# Patient Record
Sex: MALE | Race: WHITE | URBAN - METROPOLITAN AREA
[De-identification: names, ages, dates, MRNs, and addresses within clinical notes are randomized per-mention and may not be internally consistent; named-entity substitution may affect disease eponyms.]

---

## 2017-02-23 ENCOUNTER — TELEPHONE (OUTPATIENT)
Dept: INTERNAL MEDICINE | Age: 32
End: 2017-02-23

## 2017-02-23 ENCOUNTER — HOSPITAL ENCOUNTER (OUTPATIENT)
Dept: OTHER | Age: 32
Discharge: HOME | End: 2017-02-23
Attending: INTERNAL MEDICINE | Admitting: INTERNAL MEDICINE

## 2017-02-23 ENCOUNTER — OFFICE VISIT (OUTPATIENT)
Dept: INTERNAL MEDICINE | Age: 32
End: 2017-02-23

## 2017-02-23 VITALS
SYSTOLIC BLOOD PRESSURE: 122 MMHG | HEART RATE: 63 BPM | OXYGEN SATURATION: 98 % | BODY MASS INDEX: 27.25 KG/M2 | DIASTOLIC BLOOD PRESSURE: 72 MMHG | WEIGHT: 174 LBS

## 2017-02-23 DIAGNOSIS — R20.2 PARESTHESIA OF LEFT ARM AND LEG: ICD-10-CM

## 2017-02-23 DIAGNOSIS — R20.2 PARESTHESIA OF LEFT ARM AND LEG: Primary | ICD-10-CM

## 2017-02-23 PROCEDURE — 99213 OFFICE O/P EST LOW 20 MIN: CPT | Performed by: INTERNAL MEDICINE

## 2017-02-23 RX ORDER — PREDNISONE 10 MG/1
TABLET ORAL
Qty: 30 TABLET | Refills: 0 | Status: SHIPPED | OUTPATIENT
Start: 2017-02-23 | End: 2017-03-10

## 2017-02-23 ASSESSMENT — ENCOUNTER SYMPTOMS
SHORTNESS OF BREATH: 0
WHEEZING: 0
CHEST TIGHTNESS: 0
RESPIRATORY NEGATIVE: 1
GASTROINTESTINAL NEGATIVE: 1

## 2017-02-28 ENCOUNTER — HOSPITAL ENCOUNTER (OUTPATIENT)
Dept: NEUROLOGY | Age: 32
Discharge: HOME | End: 2017-02-28
Attending: INTERNAL MEDICINE | Admitting: INTERNAL MEDICINE

## 2017-02-28 DIAGNOSIS — R20.2 PARESTHESIA OF SKIN: ICD-10-CM

## 2017-03-06 RX ORDER — CYCLOBENZAPRINE HCL 10 MG
10 TABLET ORAL 3 TIMES DAILY PRN
Qty: 20 TABLET | Refills: 0 | Status: SHIPPED | OUTPATIENT
Start: 2017-03-06 | End: 2017-03-16

## 2017-03-06 RX ORDER — TRAMADOL HYDROCHLORIDE 50 MG/1
50 TABLET ORAL 3 TIMES DAILY PRN
Qty: 30 TABLET | Refills: 0 | Status: SHIPPED | OUTPATIENT
Start: 2017-03-06 | End: 2018-10-30

## 2017-03-09 ENCOUNTER — PATIENT MESSAGE (OUTPATIENT)
Dept: INTERNAL MEDICINE | Age: 32
End: 2017-03-09

## 2017-03-10 ENCOUNTER — OFFICE VISIT (OUTPATIENT)
Dept: INTERNAL MEDICINE | Age: 32
End: 2017-03-10

## 2017-03-10 VITALS
HEIGHT: 70 IN | DIASTOLIC BLOOD PRESSURE: 62 MMHG | OXYGEN SATURATION: 100 % | SYSTOLIC BLOOD PRESSURE: 102 MMHG | BODY MASS INDEX: 24.34 KG/M2 | WEIGHT: 170 LBS | HEART RATE: 70 BPM

## 2017-03-10 DIAGNOSIS — S46.812A STRAIN OF LEFT TRAPEZIUS MUSCLE, INITIAL ENCOUNTER: Primary | ICD-10-CM

## 2017-03-10 PROCEDURE — 99213 OFFICE O/P EST LOW 20 MIN: CPT | Performed by: INTERNAL MEDICINE

## 2017-03-10 RX ORDER — NABUMETONE 750 MG/1
750 TABLET, FILM COATED ORAL 2 TIMES DAILY WITH MEALS
Qty: 60 TABLET | Refills: 0 | Status: SHIPPED | OUTPATIENT
Start: 2017-03-10 | End: 2018-10-30

## 2017-03-13 ENCOUNTER — TELEPHONE (OUTPATIENT)
Dept: INTERNAL MEDICINE | Age: 32
End: 2017-03-13

## 2017-03-14 ENCOUNTER — PATIENT MESSAGE (OUTPATIENT)
Dept: INTERNAL MEDICINE | Age: 32
End: 2017-03-14

## 2018-02-01 ENCOUNTER — TELEPHONE (OUTPATIENT)
Dept: INTERNAL MEDICINE | Age: 33
End: 2018-02-01

## 2018-02-02 ENCOUNTER — OFFICE VISIT (OUTPATIENT)
Dept: INTERNAL MEDICINE | Age: 33
End: 2018-02-02

## 2018-02-02 VITALS
BODY MASS INDEX: 24.82 KG/M2 | HEART RATE: 68 BPM | WEIGHT: 173 LBS | DIASTOLIC BLOOD PRESSURE: 76 MMHG | SYSTOLIC BLOOD PRESSURE: 124 MMHG | OXYGEN SATURATION: 97 %

## 2018-02-02 DIAGNOSIS — M70.52 PES ANSERINUS BURSITIS OF LEFT KNEE: ICD-10-CM

## 2018-02-02 PROCEDURE — 99213 OFFICE O/P EST LOW 20 MIN: CPT | Performed by: INTERNAL MEDICINE

## 2018-02-02 RX ORDER — IBUPROFEN 800 MG/1
800 TABLET ORAL
Qty: 90 TABLET | Refills: 0 | Status: SHIPPED | OUTPATIENT
Start: 2018-02-02 | End: 2019-06-25

## 2018-02-02 ASSESSMENT — PATIENT HEALTH QUESTIONNAIRE - PHQ9
SUM OF ALL RESPONSES TO PHQ QUESTIONS 1-9: 0
1. LITTLE INTEREST OR PLEASURE IN DOING THINGS: 0
2. FEELING DOWN, DEPRESSED OR HOPELESS: 0
SUM OF ALL RESPONSES TO PHQ9 QUESTIONS 1 & 2: 0

## 2018-02-02 NOTE — ASSESSMENT & PLAN NOTE
X ray  ACE  Heating pad 10 minutes tid  Motrin 800 mg tid with food  Exercises given  Call if no better

## 2018-03-08 RX ORDER — ALBUTEROL SULFATE 2.5 MG/3ML
2.5 SOLUTION RESPIRATORY (INHALATION) EVERY 6 HOURS PRN
Qty: 120 EACH | Refills: 3 | Status: SHIPPED | OUTPATIENT
Start: 2018-03-08 | End: 2018-11-19

## 2018-10-30 ENCOUNTER — OFFICE VISIT (OUTPATIENT)
Dept: INTERNAL MEDICINE CLINIC | Age: 33
End: 2018-10-30
Payer: COMMERCIAL

## 2018-10-30 ENCOUNTER — TELEPHONE (OUTPATIENT)
Dept: INTERNAL MEDICINE CLINIC | Age: 33
End: 2018-10-30

## 2018-10-30 VITALS
HEART RATE: 72 BPM | BODY MASS INDEX: 23.85 KG/M2 | SYSTOLIC BLOOD PRESSURE: 120 MMHG | HEIGHT: 70 IN | DIASTOLIC BLOOD PRESSURE: 60 MMHG | OXYGEN SATURATION: 97 % | WEIGHT: 166.6 LBS | TEMPERATURE: 97.6 F

## 2018-10-30 DIAGNOSIS — J45.20 INTERMITTENT ASTHMA WITHOUT COMPLICATION, UNSPECIFIED ASTHMA SEVERITY: ICD-10-CM

## 2018-10-30 PROCEDURE — 99213 OFFICE O/P EST LOW 20 MIN: CPT | Performed by: INTERNAL MEDICINE

## 2018-10-30 RX ORDER — METHYLPREDNISOLONE 4 MG/1
TABLET ORAL
Qty: 1 KIT | Refills: 0 | Status: SHIPPED | OUTPATIENT
Start: 2018-10-30 | End: 2018-11-05

## 2018-10-30 ASSESSMENT — ENCOUNTER SYMPTOMS
SHORTNESS OF BREATH: 1
CHEST TIGHTNESS: 0
WHEEZING: 1
GASTROINTESTINAL NEGATIVE: 1
COUGH: 1

## 2018-10-30 ASSESSMENT — PATIENT HEALTH QUESTIONNAIRE - PHQ9
2. FEELING DOWN, DEPRESSED OR HOPELESS: 0
SUM OF ALL RESPONSES TO PHQ9 QUESTIONS 1 & 2: 0
1. LITTLE INTEREST OR PLEASURE IN DOING THINGS: 0
SUM OF ALL RESPONSES TO PHQ QUESTIONS 1-9: 0
SUM OF ALL RESPONSES TO PHQ QUESTIONS 1-9: 0

## 2019-06-25 ENCOUNTER — HOSPITAL ENCOUNTER (EMERGENCY)
Age: 34
Discharge: HOME | End: 2019-06-25
Payer: COMMERCIAL

## 2019-06-25 VITALS
OXYGEN SATURATION: 94 % | SYSTOLIC BLOOD PRESSURE: 150 MMHG | WEIGHT: 170 LBS | TEMPERATURE: 97.9 F | DIASTOLIC BLOOD PRESSURE: 82 MMHG | RESPIRATION RATE: 16 BRPM | BODY MASS INDEX: 24.34 KG/M2 | HEIGHT: 70 IN | HEART RATE: 87 BPM

## 2019-06-25 DIAGNOSIS — K08.539 FRACTURE OF DENTURE: Primary | ICD-10-CM

## 2019-06-25 PROCEDURE — 6370000000 HC RX 637 (ALT 250 FOR IP): Performed by: PHYSICIAN ASSISTANT

## 2019-06-25 PROCEDURE — 99282 EMERGENCY DEPT VISIT SF MDM: CPT

## 2019-06-25 RX ORDER — CHLORHEXIDINE GLUCONATE 0.12 MG/ML
15 RINSE ORAL 2 TIMES DAILY
Qty: 420 ML | Refills: 0 | Status: SHIPPED | OUTPATIENT
Start: 2019-06-25 | End: 2019-07-09

## 2019-06-25 RX ORDER — IBUPROFEN 800 MG/1
800 TABLET ORAL ONCE
Status: COMPLETED | OUTPATIENT
Start: 2019-06-25 | End: 2019-06-25

## 2019-06-25 RX ORDER — HYDROCODONE BITARTRATE AND ACETAMINOPHEN 5; 325 MG/1; MG/1
1 TABLET ORAL ONCE
Status: COMPLETED | OUTPATIENT
Start: 2019-06-25 | End: 2019-06-25

## 2019-06-25 RX ORDER — HYDROCODONE BITARTRATE AND ACETAMINOPHEN 5; 325 MG/1; MG/1
1 TABLET ORAL EVERY 6 HOURS PRN
Qty: 10 TABLET | Refills: 0 | Status: SHIPPED | OUTPATIENT
Start: 2019-06-25 | End: 2019-06-28

## 2019-06-25 RX ORDER — IBUPROFEN 800 MG/1
800 TABLET ORAL EVERY 8 HOURS PRN
Qty: 20 TABLET | Refills: 0 | Status: SHIPPED | OUTPATIENT
Start: 2019-06-25

## 2019-06-25 RX ADMIN — IBUPROFEN 800 MG: 800 TABLET, FILM COATED ORAL at 22:23

## 2019-06-25 RX ADMIN — HYDROCODONE BITARTRATE AND ACETAMINOPHEN 1 TABLET: 5; 325 TABLET ORAL at 22:23

## 2019-06-25 ASSESSMENT — ENCOUNTER SYMPTOMS
VOMITING: 0
ABDOMINAL PAIN: 0
WHEEZING: 0
NAUSEA: 0
COUGH: 0
SHORTNESS OF BREATH: 0
RHINORRHEA: 0
DIARRHEA: 0

## 2019-06-25 ASSESSMENT — PAIN SCALES - GENERAL
PAINLEVEL_OUTOF10: 6
PAINLEVEL_OUTOF10: 6

## 2019-06-26 ENCOUNTER — TELEPHONE (OUTPATIENT)
Dept: INTERNAL MEDICINE CLINIC | Age: 34
End: 2019-06-26

## 2019-06-26 NOTE — ED PROVIDER NOTES
[facility]        Pt Name: Champ Farr  MRN: [id number]  [place] 1985  Date of evaluation: 6/25/2019  Provider: Basil Odom PA-C  PCP: Darin Chen MD    This patient was not seen and evaluated by the attending physician No att. providers found. CHIEF COMPLAINT       Chief Complaint   Patient presents with   [address] Injury     playing hockey, struck in the mouth with hockey stick, front two teeth missing       HISTORY OF PRESENT ILLNESS   (Location/Symptom, Timing/Onset, Context/Setting, Quality, Duration, Modifying Factors, Severity)  Note limiting factors. Champ Farr is a 27 y.o. male who presents for evaluation of dental injury. Patient states he was playing hockey when he got hit in the teeth with a stick. He reports fracture of 2 front teeth and states that he got shoved up into his gums. Slight oozing noted. He denies syncope. No dizziness/lightheadedness, weakness or visual disturbances. No other injuries or complaints at this time. Nursing Notes were all reviewed and agreed with or any disagreements were addressed  in the HPI. REVIEW OF SYSTEMS    (2-9 systems for level 4, 10 or more for level 5)     Review of Systems   Constitutional: Negative for appetite change, chills and fever. HENT: Positive for dental problem. Negative for congestion and rhinorrhea. Respiratory: Negative for cough, shortness of breath and wheezing. Cardiovascular: Negative for chest pain. Gastrointestinal: Negative for abdominal pain, diarrhea, nausea and vomiting. Genitourinary: Negative for difficulty urinating, dysuria and hematuria. Musculoskeletal: Negative for neck pain and neck stiffness. Skin: Negative for rash. Neurological: Negative for dizziness, syncope, weakness, light-headedness and headaches. Positives and Pertinent negatives as per HPI.   Except as noted abovein the ROS, all other systems were reviewed and negative. PAST MEDICAL HISTORY     Past Medical History:   Diagnosis Date    Asthma          SURGICAL HISTORY   History reviewed. No pertinent surgical history.       CURRENTMEDICATIONS       Previous Medications    VENTOLIN  (90 BASE) MCG/ACT INHALER    INHALE TWO PUFFS BY MOUTH EVERY 6 HOURS AS NEEDED FOR WHEEZING         ALLERGIES     Ciprofloxacin and Penicillins    FAMILYHISTORY       Family History   Problem Relation Age of Onset    Hypertension Mother     Asthma Father           SOCIAL HISTORY       Social History     Socioeconomic History    Marital status: Single     Spouse name: None    Number of children: 0    Years of education: None    Highest education level: None   Occupational History    Occupation:      Comment: [address] [place] [address] Needs    Financial resource strain: None    Food insecurity:     Worry: None     Inability: None    Transportation needs:     Medical: None     Non-medical: None   Tobacco Use    Smoking status: Never Smoker    Smokeless tobacco: Never Used   Substance and Sexual Activity    Alcohol use: Yes     Comment: occasionally    Drug use: No    Sexual activity: None   Lifestyle    Physical activity:     Days per week: None     Minutes per session: None    Stress: None   Relationships    Social connections:     Talks on phone: None     Gets together: None     Attends Zoroastrianism service: None     Active member of club or organization: None     Attends meetings of clubs or organizations: None     Relationship status: None    Intimate partner violence:     Fear of current or ex partner: None     Emotionally abused: None     Physically abused: None     Forced sexual activity: None   Other Topics Concern    None   Social History Narrative    Exercise: Hockey    Seatbelt use: yes    Caffeine intake: 2 cups coffee daily                   SCREENINGS             PHYSICAL EXAM    (up to 7 for level 4, 8 or more for level 5)     ED Triage Vitals [06/25/19 2134]   BP Temp Temp src Pulse Resp SpO2 Height Weight   (!) 150/82 97.9 °F (36.6 °C) -- 87 16 94 % 5' 10\" (1.778 m) 170 lb (77.1 kg)       Physical Exam   Constitutional: He is oriented to person, place, and time. He appears well-developed and well-nourished. HENT:   Head: Normocephalic and atraumatic. Right Ear: External ear normal.   Left Ear: External ear normal.   Nose: Nose normal.   Mouth/Throat: Oropharynx is clear and moist and mucous membranes are normal. Abnormal dentition. Eyes: Right eye exhibits no discharge. Left eye exhibits no discharge. Neck: Normal range of motion. Neck supple. Pulmonary/Chest: Effort normal. No respiratory distress. Musculoskeletal: Normal range of motion. Neurological: He is alert and oriented to person, place, and time. Skin: Skin is warm and dry. He is not diaphoretic. Psychiatric: He has a normal mood and affect. His behavior is normal.   Nursing note and vitals reviewed. DIAGNOSTIC RESULTS   LABS:    Labs Reviewed - No data to display    All other labs were within normal range or not returned as of this dictation. EKG: All EKG's are interpreted by the Emergency Department Physician in the absence of a cardiologist.  Please see their note for interpretation of EKG. RADIOLOGY:   Non-plain film images such as CT, Ultrasound and MRI are read by the radiologist. Plain radiographic images are visualized andpreliminarily interpreted by the  ED Provider with the below findings:        Interpretation perthe Radiologist below, if available at the time of this note:    No orders to display     No results found.         PROCEDURES   Unless otherwise noted below, none     Procedures    CRITICAL CARE TIME   N/A    CONSULTS:  None      EMERGENCY DEPARTMENT COURSE and DIFFERENTIAL DIAGNOSIS/MDM:   Vitals:    Vitals:    06/25/19 2134   BP: (!) 150/82   Pulse: 87   Resp: 16   Temp: 97.9 °F (36.6 °C)   SpO2: 94% Weight: 170 lb (77.1 kg)   Height: 5' 10\" (1.778 m)       Patient was given thefollowing medications:  Medications   HYDROcodone-acetaminophen (NORCO) 5-325 MG per tablet 1 tablet (has no administration in time range)   ibuprofen (ADVIL;MOTRIN) tablet 800 mg (has no administration in time range)       Patient presents for evaluation of dental injury. On exam, he is well-appearing and in no acute distress. Vitals are stable and he is afebrile. He does have dental fracture noted to central upper incisors. There is discoloration ecchymoses noted to gingiva. No obvious laceration. No active bleeding. Remainder of scalp is atraumatic. He is alert and oriented x3. GCS 15. No other injuries or complaints at this time. He was given Motrin and Norco for symptomatic relief, sent with prescriptions for this and Peridex, given list of dental clinics. Patient denies dysphasia, dyspnea, neck stiffness or odynophagia. There is no brawny edema, uvular deviation, meningismus, stridor, trismus or drooling. I estimate there is LOW risk for a DEEP SPACE INFECTION (e.g., ROMELS ANGINA OR RETROPHARYNGEAL ABSCESS), MENINGITIS, or AIRWAY COMPROMISE. There is also NO HEART MURMUR NOTED ON EXAM thus I consider the discharge disposition reasonable. Patient will be given the dental clinic list and advised to start calling first thing tomorrow morning to schedule a follow-up visit. Patient is advised to return to the emergency department with any concerns. He is agreeable to this plan and stable for discharge at this time. FINAL IMPRESSION      1.  Fracture of denture          DISPOSITION/PLAN   DISPOSITION Decision To Discharge 06/25/2019 10:13:49 PM      PATIENT REFERREDTO:  Bladimir Chowdhury MD  [address]  [phone]    Call   For a re-check in  1-2  days    [facility] Emergency Department  [address] [place] [address]  [phone]  Go to   If symptoms worsen      DISCHARGE MEDICATIONS:  New Prescriptions    CHLORHEXIDINE (PERIDEX) 0.12 % SOLUTION    Take 15 mLs by mouth 2 times daily for 14 days    HYDROCODONE-ACETAMINOPHEN (NORCO) 5-325 MG PER TABLET    Take 1 tablet by mouth every 6 hours as needed for Pain for up to 3 days.     IBUPROFEN (ADVIL;MOTRIN) 800 MG TABLET    Take 1 tablet by mouth every 8 hours as needed for Pain or Fever       DISCONTINUED MEDICATIONS:  Discontinued Medications    IBUPROFEN (ADVIL;MOTRIN) 800 MG TABLET    Take 1 tablet by mouth 3 times daily (with meals)              (Please note that portions ofthis note were completed with a voice recognition program.  Efforts were made to edit the dictations but occasionally words are mis-transcribed.)    Paco Tang PA-C (electronically signed)            Dre Cheema PA-C  06/25/19 0896

## 2019-07-08 RX ORDER — ALBUTEROL SULFATE 90 UG/1
AEROSOL, METERED RESPIRATORY (INHALATION)
Qty: 1 INHALER | Refills: 3 | Status: SHIPPED | OUTPATIENT
Start: 2019-07-08 | End: 2020-01-22

## 2019-08-29 ENCOUNTER — TELEPHONE (OUTPATIENT)
Dept: PHARMACY | Facility: CLINIC | Age: 34
End: 2019-08-29

## 2019-08-29 RX ORDER — ALBUTEROL SULFATE 2.5 MG/3ML
2.5 SOLUTION RESPIRATORY (INHALATION) EVERY 6 HOURS PRN
COMMUNITY
End: 2019-10-30 | Stop reason: SDUPTHER

## 2019-08-29 RX ORDER — LEVOCETIRIZINE DIHYDROCHLORIDE 5 MG/1
5 TABLET, FILM COATED ORAL NIGHTLY
COMMUNITY

## 2019-08-29 ASSESSMENT — ASTHMA QUESTIONNAIRES
QUESTION_4 LAST FOUR WEEKS HOW OFTEN HAVE YOU USED YOUR RESCUE INHALER OR NEBULIZER MEDICATION (SUCH AS ALBUTEROL): 3
ACT_TOTALSCORE: 20
QUESTION_1 LAST FOUR WEEKS HOW MUCH OF THE TIME DID YOUR ASTHMA KEEP YOU FROM GETTING AS MUCH DONE AT WORK, SCHOOL OR AT HOME: 5
QUESTION_3 LAST FOUR WEEKS HOW OFTEN DID YOUR ASTHMA SYMPTOMS (WHEEZING, COUGHING, SHORTNESS OF BREATH, CHEST TIGHTNESS OR PAIN) WAKE YOU UP AT NIGHT OR EARLIER THAN USUAL IN THE MORNING: 4
QUESTION_5 LAST FOUR WEEKS HOW WOULD YOU RATE YOUR ASTHMA CONTROL: 4
QUESTION_2 LAST FOUR WEEKS HOW OFTEN HAVE YOU HAD SHORTNESS OF BREATH: 4

## 2019-10-30 ENCOUNTER — OFFICE VISIT (OUTPATIENT)
Dept: INTERNAL MEDICINE CLINIC | Age: 34
End: 2019-10-30
Payer: COMMERCIAL

## 2019-10-30 VITALS
WEIGHT: 144 LBS | HEART RATE: 62 BPM | SYSTOLIC BLOOD PRESSURE: 114 MMHG | DIASTOLIC BLOOD PRESSURE: 72 MMHG | BODY MASS INDEX: 20.66 KG/M2 | OXYGEN SATURATION: 98 %

## 2019-10-30 DIAGNOSIS — Z23 FLU VACCINE NEED: Primary | ICD-10-CM

## 2019-10-30 DIAGNOSIS — J45.20 INTERMITTENT ASTHMA WITHOUT COMPLICATION, UNSPECIFIED ASTHMA SEVERITY: ICD-10-CM

## 2019-10-30 PROCEDURE — 99213 OFFICE O/P EST LOW 20 MIN: CPT | Performed by: INTERNAL MEDICINE

## 2019-10-30 RX ORDER — ALBUTEROL SULFATE 2.5 MG/3ML
2.5 SOLUTION RESPIRATORY (INHALATION) EVERY 6 HOURS PRN
Qty: 120 EACH | Refills: 1 | Status: SHIPPED | OUTPATIENT
Start: 2019-10-30 | End: 2020-12-01 | Stop reason: SDUPTHER

## 2019-10-30 ASSESSMENT — ENCOUNTER SYMPTOMS
WHEEZING: 0
RESPIRATORY NEGATIVE: 1
GASTROINTESTINAL NEGATIVE: 1
CHEST TIGHTNESS: 0
SHORTNESS OF BREATH: 0

## 2019-10-30 ASSESSMENT — PATIENT HEALTH QUESTIONNAIRE - PHQ9
SUM OF ALL RESPONSES TO PHQ9 QUESTIONS 1 & 2: 0
SUM OF ALL RESPONSES TO PHQ QUESTIONS 1-9: 0
SUM OF ALL RESPONSES TO PHQ QUESTIONS 1-9: 0
1. LITTLE INTEREST OR PLEASURE IN DOING THINGS: 0
2. FEELING DOWN, DEPRESSED OR HOPELESS: 0

## 2020-01-22 RX ORDER — ALBUTEROL SULFATE 90 UG/1
AEROSOL, METERED RESPIRATORY (INHALATION)
Qty: 18 G | Refills: 0 | Status: SHIPPED | OUTPATIENT
Start: 2020-01-22 | End: 2020-03-16 | Stop reason: SDUPTHER

## 2020-03-16 RX ORDER — ALBUTEROL SULFATE 90 UG/1
AEROSOL, METERED RESPIRATORY (INHALATION)
Qty: 18 G | Refills: 0 | Status: SHIPPED | OUTPATIENT
Start: 2020-03-16 | End: 2020-05-21

## 2020-03-16 RX ORDER — ALBUTEROL SULFATE 90 UG/1
AEROSOL, METERED RESPIRATORY (INHALATION)
Qty: 18 G | Refills: 0 | Status: SHIPPED | OUTPATIENT
Start: 2020-03-16 | End: 2020-06-23

## 2020-03-19 ENCOUNTER — PATIENT MESSAGE (OUTPATIENT)
Dept: INTERNAL MEDICINE CLINIC | Age: 35
End: 2020-03-19

## 2020-03-19 RX ORDER — METHYLPREDNISOLONE 4 MG/1
TABLET ORAL
Qty: 1 KIT | Refills: 0 | Status: SHIPPED | OUTPATIENT
Start: 2020-03-19 | End: 2020-03-25

## 2020-03-19 NOTE — LETTER
[facility] [address]  [address] [address]  [address]  Phone: [phone]  Fax: [phone]    Rojelio Morris MD        March 19, 2020      To Whom It May Concern:    I have been treating Mr Anneliese Ritter for many years. He is treated for asthma. It is presently not safe for Mr Anneliese Ritter to be in the crowded workplace and I feel he should be working from home until May 1, 2020. Please call if you have any questions.       Sincerely,          Rojelio Morris MD

## 2020-05-21 ENCOUNTER — OFFICE VISIT (OUTPATIENT)
Dept: FAMILY MEDICINE CLINIC | Age: 35
End: 2020-05-21
Payer: COMMERCIAL

## 2020-05-21 VITALS — DIASTOLIC BLOOD PRESSURE: 92 MMHG | TEMPERATURE: 97.3 F | SYSTOLIC BLOOD PRESSURE: 140 MMHG | HEART RATE: 60 BPM

## 2020-05-21 PROCEDURE — 99213 OFFICE O/P EST LOW 20 MIN: CPT | Performed by: NURSE PRACTITIONER

## 2020-05-21 ASSESSMENT — PATIENT HEALTH QUESTIONNAIRE - PHQ9
1. LITTLE INTEREST OR PLEASURE IN DOING THINGS: 0
SUM OF ALL RESPONSES TO PHQ QUESTIONS 1-9: 0
SUM OF ALL RESPONSES TO PHQ QUESTIONS 1-9: 0
2. FEELING DOWN, DEPRESSED OR HOPELESS: 0
SUM OF ALL RESPONSES TO PHQ9 QUESTIONS 1 & 2: 0

## 2020-05-22 PROBLEM — L84 CORN OF FOOT: Status: ACTIVE | Noted: 2020-05-22

## 2020-05-22 ASSESSMENT — ENCOUNTER SYMPTOMS
DIARRHEA: 0
COLOR CHANGE: 0
BACK PAIN: 0
SHORTNESS OF BREATH: 0
COUGH: 0
SINUS PRESSURE: 0
ABDOMINAL PAIN: 0
SINUS PAIN: 0
WHEEZING: 0
CONSTIPATION: 0

## 2020-05-22 NOTE — ASSESSMENT & PLAN NOTE
Advised proper fitting shoes   OTC corn pads for comfort   Moisturize feet daily   Can use pumice stone to remove dead skin   Call if no improvement for debulking

## 2020-06-02 ENCOUNTER — TELEPHONE (OUTPATIENT)
Dept: PHARMACY | Facility: CLINIC | Age: 35
End: 2020-06-02

## 2020-06-02 ASSESSMENT — ASTHMA QUESTIONNAIRES
ACT_TOTALSCORE: 21
QUESTION_3 LAST FOUR WEEKS HOW OFTEN DID YOUR ASTHMA SYMPTOMS (WHEEZING, COUGHING, SHORTNESS OF BREATH, CHEST TIGHTNESS OR PAIN) WAKE YOU UP AT NIGHT OR EARLIER THAN USUAL IN THE MORNING: 4
QUESTION_4 LAST FOUR WEEKS HOW OFTEN HAVE YOU USED YOUR RESCUE INHALER OR NEBULIZER MEDICATION (SUCH AS ALBUTEROL): 4
QUESTION_1 LAST FOUR WEEKS HOW MUCH OF THE TIME DID YOUR ASTHMA KEEP YOU FROM GETTING AS MUCH DONE AT WORK, SCHOOL OR AT HOME: 5
QUESTION_5 LAST FOUR WEEKS HOW WOULD YOU RATE YOUR ASTHMA CONTROL: 4
QUESTION_2 LAST FOUR WEEKS HOW OFTEN HAVE YOU HAD SHORTNESS OF BREATH: 4

## 2020-06-02 NOTE — TELEPHONE ENCOUNTER
CLINICAL PHARMACY CONSULT: ASTHMA INHALER REVIEW    SUBJECTIVE:   Saeed Mora is a 38 y.o. male Identified as an Asthma care gap for [place]: high medication possession ratio of rescue to maintenance inhalers. Per [facility] 'JUSTIN' , has filled 0 controller fills and 8 rescue fills in the last year- [facility] in the past few months, 1 broke on him and he and lost 1 inhaler. OBJECTIVE:  Allergies   Allergen Reactions    Ciprofloxacin Hives    Penicillins Hives       Medications per current medication list:  Current Outpatient Medications   Medication Sig Dispense Refill    albuterol sulfate  (90 Base) MCG/ACT inhaler INHALE 2 PUFFS BY MOUTH EVERY 6 HOURS AS NEEDED FOR WHEEZING 18 g 0    albuterol (PROVENTIL) (2.5 MG/3ML) 0.083% nebulizer solution Take 3 mLs by nebulization every 6 hours as needed for Wheezing 120 each 1    levocetirizine (XYZAL) 5 MG tablet Take 5 mg by mouth nightly      ibuprofen (ADVIL;MOTRIN) 800 MG tablet Take 1 tablet by mouth every 8 hours as needed for Pain or Fever 20 tablet 0     No current facility-administered medications for this visit. Pertinent Labs/Vitals:  BP Readings from Last 3 Encounters:   05/21/20 (!) 140/92   10/30/19 114/72   06/25/19 (!) 150/82     No results found for: LABMICR, [id number]  ALT   Date Value Ref Range Status   07/17/2014 36 10 - 40 U/L Final     AST   Date Value Ref Range Status   07/17/2014 28 15 - 37 U/L Final       Lab Results   Component Value Date    CREATININE 0.8 (L) 07/17/2014       CrCl cannot be calculated (Patient's most recent lab result is older than the maximum 120 days allowed. ).     Social History:   Social History     Tobacco Use    Smoking status: Never Smoker    Smokeless tobacco: Never Used   Substance Use Topics    Alcohol use: Yes     Comment: occasionally       Immunizations:   Most Recent Immunizations   Administered Date(s) Administered    Hepatitis A Adult (Havrix, Vaqta) 09/03/2019    week   d. Once a week or less for SOB purposes. Although he states actually uses daily because he exercise almost every day and uses before exercise.  e. Not at all  5. How would you rate your asthma control during the past 4 weeks?  a. Not controlled at all  b. Poorly controlled   c. Somewhat controlled   d. Well controlled  e. Completely controlled     Total Score = 21 well controlled    PLAN:  - Continue to use albuterol prn  - Continue to use allergy medications  - Contact provider with any worsening of symptoms or concerns. Patient has following risk factors of: asthma. Education provided regarding infection prevention, and signs and symptoms of COVID-19 and when to seek medical attention with patient who verbalized understanding. Discussed exposure protocols and quarantine from [address] you at higher risk for severe illness 2019 and given an opportunity for questions and concerns. From [facility]: Are you at higher risk for severe illness?  Wash your hands often.  Avoid close contact (6 feet, which is about two arm lengths) with people who are sick.  Put distance between yourself and other people if COVID-19 is spreading in your community.  Clean and disinfect frequently touched surfaces.  Avoid all cruise travel and non-essential air travel.  Call your healthcare professional if you have concerns about COVID-19 and your underlying condition or if you are sick. Thank you,  Franki Roman, PharmD, [place], [address] [place] [address] Pharmacist  O: [phone]  Department, toll free: [phone], option 7     No future appointments.     CLINICAL PHARMACY CONSULT: MED RECONCILIATION/REVIEW ADDENDUM  For Pharmacy Admin Tracking Only  PHSO: Yes  Total # of Interventions Recommended: 0  - Maintenance Safety Lab Monitoring #: 1  Recommended intervention potential cost savings: 1  Total Interventions Accepted: 0  Time Spent (min): 15

## 2020-06-23 RX ORDER — ALBUTEROL SULFATE 90 UG/1
AEROSOL, METERED RESPIRATORY (INHALATION)
Qty: 18 G | Refills: 0 | Status: SHIPPED | OUTPATIENT
Start: 2020-06-23 | End: 2020-09-09

## 2020-09-09 RX ORDER — ALBUTEROL SULFATE 90 UG/1
AEROSOL, METERED RESPIRATORY (INHALATION)
Qty: 18 G | Refills: 0 | Status: SHIPPED | OUTPATIENT
Start: 2020-09-09 | End: 2020-12-01 | Stop reason: SDUPTHER

## 2020-11-25 ENCOUNTER — OFFICE VISIT (OUTPATIENT)
Dept: PRIMARY CARE CLINIC | Age: 35
End: 2020-11-25
Payer: COMMERCIAL

## 2020-11-25 PROCEDURE — 99211 OFF/OP EST MAY X REQ PHY/QHP: CPT | Performed by: NURSE PRACTITIONER

## 2020-11-25 NOTE — PROGRESS NOTES
Jodie Calderon received a viral test for COVID-19. They were educated on isolation and quarantine as appropriate. For any symptoms, they were directed to seek care from their PCP, given contact information to establish with a doctor, directed to an urgent care or the emergency room.

## 2020-11-28 LAB — SARS-COV-2, NAA: NOT DETECTED

## 2020-12-01 ENCOUNTER — TELEPHONE (OUTPATIENT)
Dept: INTERNAL MEDICINE CLINIC | Age: 35
End: 2020-12-01

## 2020-12-01 RX ORDER — ALBUTEROL SULFATE 90 UG/1
AEROSOL, METERED RESPIRATORY (INHALATION)
Qty: 18 G | Refills: 0 | Status: SHIPPED | OUTPATIENT
Start: 2020-12-01 | End: 2021-03-17

## 2020-12-01 RX ORDER — ALBUTEROL SULFATE 2.5 MG/3ML
2.5 SOLUTION RESPIRATORY (INHALATION) EVERY 6 HOURS PRN
Qty: 120 EACH | Refills: 1 | Status: SHIPPED | OUTPATIENT
Start: 2020-12-01 | End: 2021-09-20 | Stop reason: SDUPTHER

## 2020-12-01 NOTE — TELEPHONE ENCOUNTER
Patient requesting a medication refill. Medication albuterol (PROVENTIL)  Dosage (2.5 MG/3ML) 0.083% nebulizer solution   FrequencyTake 3 mLs by nebulization every 6 hours as needed for Wheezing   Last filled on 10/30/19  [address], [phone]      Patient requesting a medication refill.   Medication albuterol sulfate HFA   Dosage 108 (90 Base) MCG/ACT inhaler   FrequencyINHALE 2 PUFFS BY MOUTH EVERY 6 HOURS AS NEEDED FOR WHEEZING   Last filled on 9/9/20  Kesha [address], [facility]

## 2020-12-29 ENCOUNTER — OFFICE VISIT (OUTPATIENT)
Dept: PRIMARY CARE CLINIC | Age: 35
End: 2020-12-29
Payer: COMMERCIAL

## 2020-12-29 PROCEDURE — 99211 OFF/OP EST MAY X REQ PHY/QHP: CPT | Performed by: NURSE PRACTITIONER

## 2020-12-29 NOTE — PROGRESS NOTES
Shahrzad Chamberlain received a viral test for COVID-19. They were educated on isolation and quarantine as appropriate. For any symptoms, they were directed to seek care from their PCP, given contact information to establish with a doctor, directed to an urgent care or the emergency room.

## 2020-12-30 LAB — SARS-COV-2, NAA: DETECTED

## 2020-12-31 ENCOUNTER — TELEPHONE (OUTPATIENT)
Dept: INTERNAL MEDICINE CLINIC | Age: 35
End: 2020-12-31

## 2020-12-31 NOTE — TELEPHONE ENCOUNTER
Pt tested positive for COVID  He's asking what the next steps are, how long should he quarantine, etc

## 2021-03-17 RX ORDER — ALBUTEROL SULFATE 90 UG/1
AEROSOL, METERED RESPIRATORY (INHALATION)
Qty: 18 G | Refills: 0 | Status: SHIPPED | OUTPATIENT
Start: 2021-03-17 | End: 2021-07-06

## 2021-07-06 RX ORDER — ALBUTEROL SULFATE 90 UG/1
AEROSOL, METERED RESPIRATORY (INHALATION)
Qty: 18 G | Refills: 0 | Status: SHIPPED | OUTPATIENT
Start: 2021-07-06

## 2021-09-20 RX ORDER — ALBUTEROL SULFATE 2.5 MG/3ML
2.5 SOLUTION RESPIRATORY (INHALATION) EVERY 6 HOURS PRN
Qty: 120 EACH | Refills: 1 | Status: SHIPPED | OUTPATIENT
Start: 2021-09-20

## 2022-12-08 ENCOUNTER — APPOINTMENT (OUTPATIENT)
Dept: CT IMAGING | Age: 37
End: 2022-12-08
Payer: COMMERCIAL

## 2022-12-08 ENCOUNTER — HOSPITAL ENCOUNTER (EMERGENCY)
Age: 37
Discharge: HOME | End: 2022-12-08
Payer: COMMERCIAL

## 2022-12-08 VITALS
OXYGEN SATURATION: 99 % | DIASTOLIC BLOOD PRESSURE: 72 MMHG | RESPIRATION RATE: 24 BRPM | SYSTOLIC BLOOD PRESSURE: 129 MMHG | TEMPERATURE: 98.7 F | HEART RATE: 68 BPM

## 2022-12-08 DIAGNOSIS — R10.9 LEFT FLANK PAIN: Primary | ICD-10-CM

## 2022-12-08 DIAGNOSIS — N30.01 ACUTE CYSTITIS WITH HEMATURIA: ICD-10-CM

## 2022-12-08 LAB
A/G RATIO: 1.5 (ref 1.1–2.2)
ALBUMIN SERPL-MCNC: 4.1 G/DL (ref 3.4–5)
ALP BLD-CCNC: 56 U/L (ref 40–129)
ALT SERPL-CCNC: 17 U/L (ref 10–40)
ANION GAP SERPL CALCULATED.3IONS-SCNC: 9 MMOL/L (ref 3–16)
AST SERPL-CCNC: 15 U/L (ref 15–37)
BASOPHILS ABSOLUTE: 0.1 K/UL (ref 0–0.2)
BASOPHILS RELATIVE PERCENT: 0.3 %
BILIRUB SERPL-MCNC: 0.3 MG/DL (ref 0–1)
BILIRUBIN URINE: NEGATIVE
BLOOD, URINE: ABNORMAL
BUN BLDV-MCNC: 9 MG/DL (ref 7–20)
CALCIUM SERPL-MCNC: 9.3 MG/DL (ref 8.3–10.6)
CHLORIDE BLD-SCNC: 100 MMOL/L (ref 99–110)
CLARITY: ABNORMAL
CO2: 28 MMOL/L (ref 21–32)
COLOR: YELLOW
CREAT SERPL-MCNC: 0.7 MG/DL (ref 0.9–1.3)
EOSINOPHILS ABSOLUTE: 0.1 K/UL (ref 0–0.6)
EOSINOPHILS RELATIVE PERCENT: 0.6 %
EPITHELIAL CELLS, UA: ABNORMAL /HPF (ref 0–5)
GFR SERPL CREATININE-BSD FRML MDRD: >60 ML/MIN/{1.73_M2}
GLUCOSE BLD-MCNC: 73 MG/DL (ref 70–99)
GLUCOSE URINE: NEGATIVE MG/DL
HCT VFR BLD CALC: 42.2 % (ref 40.5–52.5)
HEMOGLOBIN: 13.8 G/DL (ref 13.5–17.5)
KETONES, URINE: NEGATIVE MG/DL
LEUKOCYTE ESTERASE, URINE: ABNORMAL
LIPASE: 22 U/L (ref 13–60)
LYMPHOCYTES ABSOLUTE: 1.9 K/UL (ref 1–5.1)
LYMPHOCYTES RELATIVE PERCENT: 10.5 %
MCH RBC QN AUTO: 28.8 PG (ref 26–34)
MCHC RBC AUTO-ENTMCNC: 32.6 G/DL (ref 31–36)
MCV RBC AUTO: 88.3 FL (ref 80–100)
MICROSCOPIC EXAMINATION: YES
MONOCYTES ABSOLUTE: 1.6 K/UL (ref 0–1.3)
MONOCYTES RELATIVE PERCENT: 8.8 %
NEUTROPHILS ABSOLUTE: 14.3 K/UL (ref 1.7–7.7)
NEUTROPHILS RELATIVE PERCENT: 79.8 %
NITRITE, URINE: NEGATIVE
PDW BLD-RTO: 12.6 % (ref 12.4–15.4)
PH UA: 6.5 (ref 5–8)
PLATELET # BLD: 354 K/UL (ref 135–450)
PMV BLD AUTO: 8.1 FL (ref 5–10.5)
POTASSIUM REFLEX MAGNESIUM: 4 MMOL/L (ref 3.5–5.1)
PROCALCITONIN: 0.04 NG/ML (ref 0–0.15)
PROTEIN UA: ABNORMAL MG/DL
RBC # BLD: 4.78 M/UL (ref 4.2–5.9)
RBC UA: ABNORMAL /HPF (ref 0–4)
SODIUM BLD-SCNC: 137 MMOL/L (ref 136–145)
SPECIFIC GRAVITY UA: <=1.005 (ref 1–1.03)
TOTAL PROTEIN: 6.9 G/DL (ref 6.4–8.2)
URINE REFLEX TO CULTURE: YES
URINE TYPE: ABNORMAL
UROBILINOGEN, URINE: 0.2 E.U./DL
WBC # BLD: 17.9 K/UL (ref 4–11)
WBC UA: ABNORMAL /HPF (ref 0–5)

## 2022-12-08 PROCEDURE — 6360000002 HC RX W HCPCS: Performed by: PHYSICIAN ASSISTANT

## 2022-12-08 PROCEDURE — 84145 PROCALCITONIN (PCT): CPT

## 2022-12-08 PROCEDURE — 36415 COLL VENOUS BLD VENIPUNCTURE: CPT

## 2022-12-08 PROCEDURE — 87186 SC STD MICRODIL/AGAR DIL: CPT

## 2022-12-08 PROCEDURE — 2580000003 HC RX 258: Performed by: PHYSICIAN ASSISTANT

## 2022-12-08 PROCEDURE — 87591 N.GONORRHOEAE DNA AMP PROB: CPT

## 2022-12-08 PROCEDURE — 87086 URINE CULTURE/COLONY COUNT: CPT

## 2022-12-08 PROCEDURE — 81001 URINALYSIS AUTO W/SCOPE: CPT

## 2022-12-08 PROCEDURE — 99284 EMERGENCY DEPT VISIT MOD MDM: CPT

## 2022-12-08 PROCEDURE — 87491 CHLMYD TRACH DNA AMP PROBE: CPT

## 2022-12-08 PROCEDURE — 87077 CULTURE AEROBIC IDENTIFY: CPT

## 2022-12-08 PROCEDURE — 74176 CT ABD & PELVIS W/O CONTRAST: CPT

## 2022-12-08 PROCEDURE — 83690 ASSAY OF LIPASE: CPT

## 2022-12-08 PROCEDURE — 80053 COMPREHEN METABOLIC PANEL: CPT

## 2022-12-08 PROCEDURE — 85025 COMPLETE CBC W/AUTO DIFF WBC: CPT

## 2022-12-08 RX ORDER — ONDANSETRON 4 MG/1
4 TABLET, FILM COATED ORAL EVERY 8 HOURS PRN
Qty: 20 TABLET | Refills: 0 | Status: SHIPPED | OUTPATIENT
Start: 2022-12-08

## 2022-12-08 RX ORDER — 0.9 % SODIUM CHLORIDE 0.9 %
1000 INTRAVENOUS SOLUTION INTRAVENOUS ONCE
Status: DISCONTINUED | OUTPATIENT
Start: 2022-12-08 | End: 2022-12-08

## 2022-12-08 RX ORDER — KETOROLAC TROMETHAMINE 30 MG/ML
15 INJECTION, SOLUTION INTRAMUSCULAR; INTRAVENOUS ONCE
Status: COMPLETED | OUTPATIENT
Start: 2022-12-08 | End: 2022-12-08

## 2022-12-08 RX ORDER — HYDROCODONE BITARTRATE AND ACETAMINOPHEN 5; 325 MG/1; MG/1
1 TABLET ORAL EVERY 6 HOURS PRN
Qty: 10 TABLET | Refills: 0 | Status: SHIPPED | OUTPATIENT
Start: 2022-12-08 | End: 2022-12-11

## 2022-12-08 RX ORDER — CEFUROXIME AXETIL 250 MG/1
250 TABLET ORAL 2 TIMES DAILY
Qty: 14 TABLET | Refills: 0 | Status: SHIPPED | OUTPATIENT
Start: 2022-12-08 | End: 2022-12-15

## 2022-12-08 RX ADMIN — CEFTRIAXONE SODIUM 1000 MG: 1 INJECTION, POWDER, FOR SOLUTION INTRAMUSCULAR; INTRAVENOUS at 22:21

## 2022-12-08 RX ADMIN — KETOROLAC TROMETHAMINE 15 MG: 30 INJECTION, SOLUTION INTRAMUSCULAR at 19:26

## 2022-12-08 ASSESSMENT — ENCOUNTER SYMPTOMS
DIARRHEA: 0
VOMITING: 0
SHORTNESS OF BREATH: 0
RHINORRHEA: 0
ABDOMINAL PAIN: 0
NAUSEA: 0
COUGH: 0

## 2022-12-09 NOTE — ED PROVIDER NOTES
[facility]        Pt Name: Tammy Hanna  MRN: [id number]  [place] 1985  Date of evaluation: 12/8/2022  Provider: Sergio Rolon PA-C  PCP: Guero Richardson MD  Note Started: 9:32 PM EST 12/8/22          MADDIE. I have evaluated this patient. My supervising physician was available for consultation. CHIEF COMPLAINT       Chief Complaint   Patient presents with    Flank Pain     Patient in with complaints of left sided flank pain since this am and dysuria. He denies hx of kidney stones. HISTORY OF PRESENT ILLNESS   (Location, Timing/Onset, Context/Setting, Quality, Duration, Modifying Factors, Severity, Associated Signs and Symptoms)  Note limiting factors. Chief Complaint: Left flank pain, hematuria    Tammy Hanna is a 44 y.o. male who presents to the emergency room for evaluation for left-sided flank pain, as well as hematuria. The patient states that this afternoon, he states that when he went to the restroom, he noticed a dull pain to his left side of his back, and he states that his urine was bloody. The patient states he continued to have pain throughout the afternoon, and he states that he noticed increasing blood in the urine. The patient reports that it is difficult to urinate at times but denies any dysuria. He denies any frequency or urgency. The patient states that since this afternoon the pain is gradually improving, and he states that the urine is actually clearer than it was. Patient denies having any nausea, vomiting or diarrhea. He has not had any fevers, cough or congestion. He denies any history of kidney stones. He denies any fall or back injury, no other complaints. Nursing Notes were all reviewed and agreed with or any disagreements were addressed in the HPI.     REVIEW OF SYSTEMS    (2-9 systems for level 4, 10 or more for level 5)     Review of Systems   Constitutional:  Negative for activity change, appetite change, chills and fever. HENT:  Negative for congestion and rhinorrhea. Respiratory:  Negative for cough and shortness of breath. Cardiovascular:  Negative for chest pain. Gastrointestinal:  Negative for abdominal pain, diarrhea, nausea and vomiting. Genitourinary:  Positive for flank pain and hematuria. Negative for difficulty urinating and dysuria. Positives and Pertinent negatives as per HPI. Except as noted above in the ROS, all other systems were reviewed and negative. PAST MEDICAL HISTORY     Past Medical History:   Diagnosis Date    Asthma          SURGICAL HISTORY   History reviewed. No pertinent surgical history.       CURRENTMEDICATIONS       Previous Medications    ALBUTEROL (PROVENTIL) (2.5 MG/3ML) 0.083% NEBULIZER SOLUTION    Take 3 mLs by nebulization every 6 hours as needed for Wheezing    ALBUTEROL SULFATE  (90 BASE) MCG/ACT INHALER    INHALE 2 PUFFS BY MOUTH EVERY 6 HOURS AS NEEDED FOR WHEEZING    IBUPROFEN (ADVIL;MOTRIN) 800 MG TABLET    Take 1 tablet by mouth every 8 hours as needed for Pain or Fever    LEVOCETIRIZINE (XYZAL) 5 MG TABLET    Take 5 mg by mouth nightly         ALLERGIES     Ciprofloxacin and Penicillins    FAMILYHISTORY       Family History   Problem Relation Age of Onset    Hypertension Mother     Asthma Father     Prostate Cancer Father           SOCIAL HISTORY       Social History     Tobacco Use    Smoking status: Never    Smokeless tobacco: Never   Substance Use Topics    Alcohol use: Yes     Comment: occasionally    Drug use: No       SCREENINGS    Patricia Coma Scale  Eye Opening: Spontaneous  Best Verbal Response: Oriented  Best Motor Response: Obeys commands  [place] Coma Scale Score: 15        PHYSICAL EXAM    (up to 7 for level 4, 8 or more for level 5)     ED Triage Vitals [12/08/22 1830]   BP Temp Temp src Heart Rate Resp SpO2 Height Weight   (!) 142/87 98.7 °F (37.1 °C) -- 75 18 98 % -- --       Physical Exam  Vitals and nursing note reviewed. Constitutional:       Appearance: He is well-developed. He is not diaphoretic. HENT:      Head: Normocephalic and atraumatic. Right Ear: External ear normal.      Left Ear: External ear normal.      Nose: Nose normal.   Eyes:      General:         Right eye: No discharge. Left eye: No discharge. Neck:      Trachea: No tracheal deviation. Cardiovascular:      Rate and Rhythm: Normal rate and regular rhythm. Heart sounds: No murmur heard. Pulmonary:      Effort: Pulmonary effort is normal. No respiratory distress. Breath sounds: Normal breath sounds. No wheezing or rales. Abdominal:      General: There is no distension. Palpations: Abdomen is soft. Tenderness: There is no abdominal tenderness. There is no guarding. Comments: No abdominal tenderness, or flank tenderness   Musculoskeletal:         General: Normal range of motion. Cervical back: Normal range of motion and neck supple. Skin:     General: Skin is warm and dry. Neurological:      Mental Status: He is alert and oriented to person, place, and time.    Psychiatric:         Behavior: Behavior normal.       DIAGNOSTIC RESULTS   LABS:    Labs Reviewed   URINALYSIS WITH REFLEX TO CULTURE - Abnormal; Notable for the following components:       Result Value    Clarity, UA CLOUDY (*)     Blood, Urine LARGE (*)     Protein, UA TRACE (*)     Leukocyte Esterase, Urine LARGE (*)     All other components within normal limits   CBC WITH AUTO DIFFERENTIAL - Abnormal; Notable for the following components:    WBC 17.9 (*)     Neutrophils Absolute 14.3 (*)     Monocytes Absolute 1.6 (*)     All other components within normal limits   COMPREHENSIVE METABOLIC PANEL W/ REFLEX TO MG FOR LOW K - Abnormal; Notable for the following components:    Creatinine 0.7 (*)     All other components within normal limits   MICROSCOPIC URINALYSIS - Abnormal; Notable for the following components: WBC, UA  (*)     RBC, UA 21-50 (*)     All other components within normal limits   CULTURE, URINE   C.TRACHOMATIS N.GONORRHOEAE DNA, URINE   LIPASE   PROCALCITONIN       When ordered only abnormal lab results are displayed. All other labs were within normal range or not returned as of this dictation. EKG: When ordered, EKG's are interpreted by the Emergency Department Physician in the absence of a cardiologist.  Please see their note for interpretation of EKG. RADIOLOGY:   Non-plain film images such as CT, Ultrasound and MRI are read by the radiologist. Plain radiographic images are visualized and preliminarily interpreted by the ED Provider with the below findings:        Interpretation per the Radiologist below, if available at the time of this note:    CT ABDOMEN PELVIS WO CONTRAST Additional Contrast? None   Final Result   1. The bladder wall appears thickened which could be due to nondistention,   but cystitis is a possibility. 2. No acute findings elsewhere with no ureteral stone or hydronephrosis. 3. Left L5 spondylolysis with grade 1 anterolisthesis of L5 on S1.           CT ABDOMEN PELVIS WO CONTRAST Additional Contrast? None    Result Date: 12/8/2022  EXAMINATION: CT OF THE ABDOMEN AND PELVIS WITHOUT CONTRAST 12/8/2022 3:43 pm TECHNIQUE: CT of the abdomen and pelvis was performed without the administration of intravenous contrast. Multiplanar reformatted images are provided for review. Automated exposure control, iterative reconstruction, and/or weight based adjustment of the mA/kV was utilized to reduce the radiation dose to as low as reasonably achievable. COMPARISON: None.  HISTORY: ORDERING SYSTEM PROVIDED HISTORY: flank TECHNOLOGIST PROVIDED HISTORY: Reason for exam:->flank Additional Contrast?->None Decision Support Exception - unselect if not a suspected or confirmed emergency medical condition->Emergency Medical Condition (MA) Reason for Exam: Flank Pain (Patient in with complaints of left sided flank pain since this am and dysuria. He denies hx of kidney stones. ). FINDINGS: Lower Chest: The lung bases are clear. There is no pleural effusion. Organs: The liver, gallbladder, spleen, adrenal glands and pancreas appear normal.  The kidneys appear normal.  There is no ureteral stone or hydronephrosis. GI/Bowel: There is no bowel dilatation or bowel wall thickening. The appendix appears normal.  The bladder is unremarkable. Pelvis: The bladder appears thickened which could be due to incomplete distension. Peritoneum/Retroperitoneum: No evidence of lymphadenopathy. Aorta is normal in caliber. No fat stranding, free fluid, free air or focal fluid collection is identified. Bones/Soft Tissues: There is mild anterolisthesis at L5-S1 with left L5 spondylolysis. No suspicious bone lesion is identified. 1. The bladder wall appears thickened which could be due to nondistention, but cystitis is a possibility. 2. No acute findings elsewhere with no ureteral stone or hydronephrosis. 3. Left L5 spondylolysis with grade 1 anterolisthesis of L5 on S1.           PROCEDURES   Unless otherwise noted below, none     Procedures    CRITICAL CARE TIME       CONSULTS:  None      EMERGENCY DEPARTMENT COURSE and DIFFERENTIAL DIAGNOSIS/MDM:   Vitals:    Vitals:    12/08/22 2142 12/08/22 2202 12/08/22 2212 12/08/22 2215   BP: 122/73 119/71  129/72   Pulse: 63  67 68   Resp: 21  23 24   Temp:       SpO2: 98%  99% 99%       Patient was given the following medications:  Medications   ketorolac (TORADOL) injection 15 mg (15 mg IntraVENous Given 12/8/22 1926)   cefTRIAXone (ROCEPHIN) 1,000 mg in dextrose 5 % 50 mL IVPB mini-bag (1,000 mg IntraVENous New Bag 12/8/22 2221)         Is this patient to be included in the SEP-1 Core Measure due to severe sepsis or septic shock?    No   Exclusion criteria - the patient is NOT to be included for SEP-1 Core Measure due to:  2+ SIRS criteria are not met    Briefly, this is a 76-year-old male who presents to the emergency department today for evaluation for left-sided flank pain and hematuria which was first noticed this afternoon. Patient states that overall the symptoms are improving, and states that the urine is starting to clear up. He has no history of kidney stones. On examination, his abdomen is benign, vital signs stable, no flank pain    He has a leukocytosis of 17.9 there is no evidence of anemia. CMP is unremarkable. Lipase is normal.  Procalcitonin is normal.  Urine does show large leukocytes and  white blood cells, there is large blood as well    CT does show bladder wall thickening which could be due to cystitis versus nondistention    I did have a long discussion with the patient, he was given a one-time dose of Toradol and states that he feels much better and would like to go home. The patient is not febrile, tachycardic, tachypneic, or hypotensive. Although he does have a leukocytosis, I do feel that clinically he likely had a kidney stone which passed, and this could certainly be due to a stress reaction with this. Patient does not have any findings of a kidney stone, again I feel that he likely had a kidney stone which passed out as he reported that he did have flank pain with hematuria, and all symptoms are otherwise improving. His procalcitonin is normal, I do not suspect sepsis, I did discuss with the patient that we would give Rocephin here, and will place on Ceftin until urine culture has returned. Strict return precautions given with the patient. He is very agreeable with this plan and states that he will follow-up with urology within the next 1 to 2 days. Patient was understanding is agreeable plan. Stable for discharge.    Results for orders placed or performed during the hospital encounter of 12/08/22   Urinalysis with Reflex to Culture    Specimen: Urine   Result Value Ref Range    Color, UA Yellow Straw/Yellow    Clarity, UA CLOUDY (A) Clear    Glucose, Ur Negative Negative mg/dL    Bilirubin Urine Negative Negative    Ketones, Urine Negative Negative mg/dL    Specific Gravity, UA <=1.005 1.005 - 1.030    Blood, Urine LARGE (A) Negative    pH, UA 6.5 5.0 - 8.0    Protein, UA TRACE (A) Negative mg/dL    Urobilinogen, Urine 0.2 <2.0 E.U./dL    Nitrite, Urine Negative Negative    Leukocyte Esterase, Urine LARGE (A) Negative    Microscopic Examination YES     Urine Type NotGiven     Urine Reflex to Culture Yes    CBC with Auto Differential   Result Value Ref Range    WBC 17.9 (H) 4.0 - 11.0 K/uL    RBC 4.78 4.20 - 5.90 M/uL    Hemoglobin 13.8 13.5 - 17.5 g/dL    Hematocrit 42.2 40.5 - 52.5 %    MCV 88.3 80.0 - 100.0 fL    MCH 28.8 26.0 - 34.0 pg    MCHC 32.6 31.0 - 36.0 g/dL    RDW 12.6 12.4 - 15.4 %    Platelets 678 938 - 774 K/uL    MPV 8.1 5.0 - 10.5 fL    Neutrophils % 79.8 %    Lymphocytes % 10.5 %    Monocytes % 8.8 %    Eosinophils % 0.6 %    Basophils % 0.3 %    Neutrophils Absolute 14.3 (H) 1.7 - 7.7 K/uL    Lymphocytes Absolute 1.9 1.0 - 5.1 K/uL    Monocytes Absolute 1.6 (H) 0.0 - 1.3 K/uL    Eosinophils Absolute 0.1 0.0 - 0.6 K/uL    Basophils Absolute 0.1 0.0 - 0.2 K/uL   Comprehensive Metabolic Panel w/ Reflex to MG   Result Value Ref Range    Sodium 137 136 - 145 mmol/L    Potassium reflex Magnesium 4.0 3.5 - 5.1 mmol/L    Chloride 100 99 - 110 mmol/L    CO2 28 21 - 32 mmol/L    Anion Gap 9 3 - 16    Glucose 73 70 - 99 mg/dL    BUN 9 7 - 20 mg/dL    Creatinine 0.7 (L) 0.9 - 1.3 mg/dL    Est, Glom Filt Rate >60 >60    Calcium 9.3 8.3 - 10.6 mg/dL    Total Protein 6.9 6.4 - 8.2 g/dL    Albumin 4.1 3.4 - 5.0 g/dL    Albumin/Globulin Ratio 1.5 1.1 - 2.2    Total Bilirubin 0.3 0.0 - 1.0 mg/dL    Alkaline Phosphatase 56 40 - 129 U/L    ALT 17 10 - 40 U/L    AST 15 15 - 37 U/L   Lipase   Result Value Ref Range    Lipase 22.0 13.0 - 60.0 U/L   Microscopic Urinalysis   Result Value Ref Range    WBC, UA  (A) 0 - 5 /HPF    RBC, UA 21-50 (A) 0 - 4 /HPF    Epithelial Cells, UA 0-1 0 - 5 /HPF   Procalcitonin   Result Value Ref Range    Procalcitonin 0.04 0.00 - 0.15 ng/mL         I estimate there is LOW risk acute appendicitis, acute cholecystitis, cholangitis, pancreatitis, diverticulitis, perforated viscus, perforated ulcer, colitis, C. diff colitis, ischemic colitis, bowel obstruction, acute dissection, thus I consider the discharge disposition reasonable. Also, there is no evidence or peritonitis, sepsis, or toxicity. Annia Olszewski and I have discussed the diagnosis and risks, and we agree with discharging home to follow-up with their primary doctor. We also discussed returning to the Emergency Department immediately if new or worsening symptoms occur. We have discussed the symptoms which are most concerning (e.g., bloody stool, fever, changing or worsening pain, vomiting) that necessitate immediate return. FINAL IMPRESSION      1. Left flank pain    2. Acute cystitis with hematuria          DISPOSITION/PLAN   DISPOSITION Discharge - Pending Orders Complete 12/08/2022 09:21:45 PM      PATIENT REFERRED TO:  Rafat Rob [id number]  [phone]    Schedule an appointment as soon as possible for a visit in 2 days      [facility] Emergency Department  [address]  [phone]    As needed, If symptoms worsen      DISCHARGE MEDICATIONS:  New Prescriptions    CEFUROXIME (CEFTIN) 250 MG TABLET    Take 1 tablet by mouth 2 times daily for 7 days    HYDROCODONE-ACETAMINOPHEN (NORCO) 5-325 MG PER TABLET    Take 1 tablet by mouth every 6 hours as needed for Pain for up to 3 days.     ONDANSETRON (ZOFRAN) 4 MG TABLET    Take 1 tablet by mouth every 8 hours as needed for Nausea       DISCONTINUED MEDICATIONS:  Discontinued Medications    No medications on file              (Please note that portions of this note were completed with a voice recognition program.  Efforts were made to edit the dictations but occasionally words are mis-transcribed.)    Sam Calvillo PA-C (electronically signed)            Sam Calvillo PA-C  12/08/22 1532

## 2022-12-10 LAB
C. TRACHOMATIS DNA ,URINE: NEGATIVE
N. GONORRHOEAE DNA, URINE: NEGATIVE
ORGANISM: ABNORMAL
URINE CULTURE, ROUTINE: ABNORMAL

## 2023-02-10 NOTE — TELEPHONE ENCOUNTER
Nutrition Assessment   Reason for Consult/Assessment: Follow up        Diet Order: Regular   Diet tolerance: Tolerating with fair appetite/intakes recorded (Patient consuming % of meals recorded.)  Choosing high protein foods on trays.       Estimated Needs:  Calculated Energy Needs: 5537-1766  kcal  Calculated protein needs:   g       Skin Assessment/Wounds  Wounds Present: Wounds present (plantar wound)             TREATMENT PLAN: Monitoring & Interventions   Intervention:          Meals & snacks: Encourage adequate intake on regular diet. Will monitor need for oral supplement.                                                                   Goal: Improve skin integrity     Dietitian will monitor: Food, beverage, and nutrient intake      Nutrition Diagnosis / PES  Nutrition Diagnosis: Increased nutrient needs  Related to: Increased nutritional demands for healing   As evidenced by: Calculated needs         Please send him a Medrol dose pack  Advise yes, he should stay home from work and work from home. Will write letter. Find out where to send it.

## 2023-07-06 ENCOUNTER — HOSPITAL ENCOUNTER (OUTPATIENT)
Age: 38
Discharge: HOME | End: 2023-07-06
Payer: COMMERCIAL

## 2023-07-06 ENCOUNTER — HOSPITAL ENCOUNTER (OUTPATIENT)
Dept: GENERAL RADIOLOGY | Age: 38
Discharge: HOME | End: 2023-07-06
Payer: COMMERCIAL

## 2023-07-06 DIAGNOSIS — R52 PAIN: ICD-10-CM

## 2023-07-06 PROCEDURE — 73630 X-RAY EXAM OF FOOT: CPT

## 2023-10-23 ENCOUNTER — OFFICE VISIT (OUTPATIENT)
Age: 38
End: 2023-10-23

## 2023-10-23 VITALS
OXYGEN SATURATION: 96 % | WEIGHT: 182.8 LBS | TEMPERATURE: 98.3 F | HEART RATE: 64 BPM | BODY MASS INDEX: 26.17 KG/M2 | DIASTOLIC BLOOD PRESSURE: 86 MMHG | HEIGHT: 70 IN | SYSTOLIC BLOOD PRESSURE: 134 MMHG

## 2023-10-23 DIAGNOSIS — R30.0 DYSURIA: ICD-10-CM

## 2023-10-23 DIAGNOSIS — N39.0 URINARY TRACT INFECTION WITHOUT HEMATURIA, SITE UNSPECIFIED: Primary | ICD-10-CM

## 2023-10-23 LAB
APPEARANCE FLUID: CLEAR
BILIRUBIN, POC: NEGATIVE
BLOOD URINE, POC: NEGATIVE
CLARITY, POC: CLEAR
COLOR, POC: YELLOW
GLUCOSE URINE, POC: NEGATIVE
KETONES, POC: NEGATIVE
LEUKOCYTE EST, POC: ABNORMAL
NITRITE, POC: NEGATIVE
PH, POC: 6
PROTEIN, POC: NEGATIVE
SPECIFIC GRAVITY, POC: <=1.005
UROBILINOGEN, POC: 0.2

## 2023-10-23 RX ORDER — SULFAMETHOXAZOLE AND TRIMETHOPRIM 800; 160 MG/1; MG/1
1 TABLET ORAL 2 TIMES DAILY
Qty: 20 TABLET | Refills: 0 | Status: SHIPPED | OUTPATIENT
Start: 2023-10-23 | End: 2023-11-02

## 2023-10-23 NOTE — PATIENT INSTRUCTIONS
Judson Johnson,    It was an absolute pleasure taking care of you today. I'm hoping you'll start feeling better as soon as possible. Please call me if you have any questions or concerns about what we talked about today. Feel free stop back in if anything changes or things seem to be getting worse. If for some reason you develop any serious or potentially life-threatening issue, call 911 or proceed to the nearest emergency department. Hopefully it will never come to that. Otherwise, it was very nice to meet you Judson Johnson.       Thanks,     Jolie Donaldson

## 2023-10-25 LAB — BACTERIA UR CULT: NORMAL
